# Patient Record
Sex: MALE | Race: WHITE | NOT HISPANIC OR LATINO | Employment: UNEMPLOYED | URBAN - METROPOLITAN AREA
[De-identification: names, ages, dates, MRNs, and addresses within clinical notes are randomized per-mention and may not be internally consistent; named-entity substitution may affect disease eponyms.]

---

## 2023-02-19 ENCOUNTER — HOSPITAL ENCOUNTER (EMERGENCY)
Facility: CLINIC | Age: 48
Discharge: HOME | End: 2023-02-19
Attending: EMERGENCY MEDICINE | Admitting: EMERGENCY MEDICINE
Payer: COMMERCIAL

## 2023-02-19 VITALS
TEMPERATURE: 98 F | OXYGEN SATURATION: 97 % | DIASTOLIC BLOOD PRESSURE: 97 MMHG | HEART RATE: 112 BPM | SYSTOLIC BLOOD PRESSURE: 142 MMHG

## 2023-02-19 DIAGNOSIS — F10.920 ALCOHOLIC INTOXICATION WITHOUT COMPLICATION (H): ICD-10-CM

## 2023-02-19 LAB
ALBUMIN SERPL BCG-MCNC: 4.6 G/DL (ref 3.5–5.2)
ALP SERPL-CCNC: 91 U/L (ref 40–129)
ALT SERPL W P-5'-P-CCNC: 64 U/L (ref 10–50)
ANION GAP SERPL CALCULATED.3IONS-SCNC: 15 MMOL/L (ref 7–15)
AST SERPL W P-5'-P-CCNC: 34 U/L (ref 10–50)
BASOPHILS # BLD AUTO: 0.1 10E3/UL (ref 0–0.2)
BASOPHILS NFR BLD AUTO: 1 %
BILIRUB SERPL-MCNC: 0.5 MG/DL
BUN SERPL-MCNC: 7.4 MG/DL (ref 6–20)
CALCIUM SERPL-MCNC: 9.6 MG/DL (ref 8.6–10)
CHLORIDE SERPL-SCNC: 99 MMOL/L (ref 98–107)
CREAT SERPL-MCNC: 1 MG/DL (ref 0.67–1.17)
DEPRECATED HCO3 PLAS-SCNC: 23 MMOL/L (ref 22–29)
EOSINOPHIL # BLD AUTO: 0.1 10E3/UL (ref 0–0.7)
EOSINOPHIL NFR BLD AUTO: 1 %
ERYTHROCYTE [DISTWIDTH] IN BLOOD BY AUTOMATED COUNT: 12.2 % (ref 10–15)
ETHANOL SERPL-MCNC: 0.15 G/DL
GFR SERPL CREATININE-BSD FRML MDRD: >90 ML/MIN/1.73M2
GLUCOSE SERPL-MCNC: 125 MG/DL (ref 70–99)
HCT VFR BLD AUTO: 49.7 % (ref 40–53)
HGB BLD-MCNC: 17.7 G/DL (ref 13.3–17.7)
HOLD SPECIMEN: NORMAL
IMM GRANULOCYTES # BLD: 0 10E3/UL
IMM GRANULOCYTES NFR BLD: 0 %
LYMPHOCYTES # BLD AUTO: 3 10E3/UL (ref 0.8–5.3)
LYMPHOCYTES NFR BLD AUTO: 38 %
MCH RBC QN AUTO: 31.7 PG (ref 26.5–33)
MCHC RBC AUTO-ENTMCNC: 35.6 G/DL (ref 31.5–36.5)
MCV RBC AUTO: 89 FL (ref 78–100)
MONOCYTES # BLD AUTO: 0.8 10E3/UL (ref 0–1.3)
MONOCYTES NFR BLD AUTO: 10 %
NEUTROPHILS # BLD AUTO: 3.9 10E3/UL (ref 1.6–8.3)
NEUTROPHILS NFR BLD AUTO: 50 %
NRBC # BLD AUTO: 0 10E3/UL
NRBC BLD AUTO-RTO: 0 /100
PLATELET # BLD AUTO: 301 10E3/UL (ref 150–450)
POTASSIUM SERPL-SCNC: 3.2 MMOL/L (ref 3.4–5.3)
PROT SERPL-MCNC: 7.6 G/DL (ref 6.4–8.3)
RBC # BLD AUTO: 5.59 10E6/UL (ref 4.4–5.9)
SARS-COV-2 RNA RESP QL NAA+PROBE: NEGATIVE
SODIUM SERPL-SCNC: 137 MMOL/L (ref 136–145)
WBC # BLD AUTO: 7.9 10E3/UL (ref 4–11)

## 2023-02-19 PROCEDURE — 85025 COMPLETE CBC W/AUTO DIFF WBC: CPT | Performed by: EMERGENCY MEDICINE

## 2023-02-19 PROCEDURE — 36415 COLL VENOUS BLD VENIPUNCTURE: CPT | Performed by: EMERGENCY MEDICINE

## 2023-02-19 PROCEDURE — 258N000003 HC RX IP 258 OP 636: Performed by: EMERGENCY MEDICINE

## 2023-02-19 PROCEDURE — 250N000013 HC RX MED GY IP 250 OP 250 PS 637: Performed by: EMERGENCY MEDICINE

## 2023-02-19 PROCEDURE — 99283 EMERGENCY DEPT VISIT LOW MDM: CPT | Mod: 25

## 2023-02-19 PROCEDURE — 96361 HYDRATE IV INFUSION ADD-ON: CPT

## 2023-02-19 PROCEDURE — U0003 INFECTIOUS AGENT DETECTION BY NUCLEIC ACID (DNA OR RNA); SEVERE ACUTE RESPIRATORY SYNDROME CORONAVIRUS 2 (SARS-COV-2) (CORONAVIRUS DISEASE [COVID-19]), AMPLIFIED PROBE TECHNIQUE, MAKING USE OF HIGH THROUGHPUT TECHNOLOGIES AS DESCRIBED BY CMS-2020-01-R: HCPCS | Performed by: EMERGENCY MEDICINE

## 2023-02-19 PROCEDURE — 96360 HYDRATION IV INFUSION INIT: CPT

## 2023-02-19 PROCEDURE — 82077 ASSAY SPEC XCP UR&BREATH IA: CPT | Performed by: EMERGENCY MEDICINE

## 2023-02-19 PROCEDURE — 80053 COMPREHEN METABOLIC PANEL: CPT | Performed by: EMERGENCY MEDICINE

## 2023-02-19 PROCEDURE — C9803 HOPD COVID-19 SPEC COLLECT: HCPCS

## 2023-02-19 RX ORDER — POTASSIUM CHLORIDE 1500 MG/1
40 TABLET, EXTENDED RELEASE ORAL ONCE
Status: DISCONTINUED | OUTPATIENT
Start: 2023-02-19 | End: 2023-02-19 | Stop reason: HOSPADM

## 2023-02-19 RX ORDER — FOLIC ACID 1 MG/1
2 TABLET ORAL ONCE
Status: COMPLETED | OUTPATIENT
Start: 2023-02-19 | End: 2023-02-19

## 2023-02-19 RX ORDER — MULTIVITAMIN,THERAPEUTIC
1 TABLET ORAL ONCE
Status: COMPLETED | OUTPATIENT
Start: 2023-02-19 | End: 2023-02-19

## 2023-02-19 RX ADMIN — THERA TABS 1 TABLET: TAB at 15:43

## 2023-02-19 RX ADMIN — SODIUM CHLORIDE 1000 ML: 9 INJECTION, SOLUTION INTRAVENOUS at 15:04

## 2023-02-19 RX ADMIN — THIAMINE HCL TAB 100 MG 100 MG: 100 TAB at 15:42

## 2023-02-19 RX ADMIN — FOLIC ACID 2 MG: 1 TABLET ORAL at 15:42

## 2023-02-19 ASSESSMENT — ACTIVITIES OF DAILY LIVING (ADL)
ADLS_ACUITY_SCORE: 35
ADLS_ACUITY_SCORE: 35

## 2023-02-19 ASSESSMENT — ENCOUNTER SYMPTOMS
VOMITING: 0
FEVER: 0
SHORTNESS OF BREATH: 0
HALLUCINATIONS: 0
CHILLS: 0

## 2023-02-19 NOTE — ED PROVIDER NOTES
History     Chief Complaint:  Alcohol Intoxication     The history is provided by the patient.      David Boyd is a 48 year old male with a history of hypertension, anxiety, depression, autism, and alcohol use disorder who presents via EMS for alcohol intoxication. The patient is currently living in a group home that specializes in mental health and chemical dependency but has continued to drink daily, with his last drink being just prior to EMS arrival. Presently, the patient states that he wants to stop drinking and is open to treatment for this. He denies any suicidal or homicidal ideations and notes having only 1 auditory hallucination in the past. He also denies any recent chest pain or shortness of breath and has had no vomiting, fever, or chills. The patient does have a history of withdrawal, without seizures, and currently uses chewing tobacco. He does not use cigarettes or other drugs.     Independent Historian:   None - Patient Only    Review of External Notes: See MDM below.    ROS:  Review of Systems   Constitutional: Negative for chills and fever.        (+) alcohol intoxication   Respiratory: Negative for shortness of breath.    Cardiovascular: Negative for chest pain.   Gastrointestinal: Negative for vomiting.   Psychiatric/Behavioral: Negative for hallucinations and suicidal ideas.        (-) homicidal ideations   All other systems reviewed and are negative.    Allergies:  The patient has no known drug allergies.    Medications:    Cymbalta  Ritalin  Seroquel  Abilify    Past Medical History:    ADHD  Anxiety  Depression  Alcohol use disorder, severe  Alcoholic hepatitis, unspecified whether ascites present  Autism spectrum disorder  Hypertension  Tobacco use  Multinodular goiter  Migraines   Hypokalemia  Hyponatremia     Past Surgical History:    PE tubes  Teeth extraction    Family History:    Neurologic disorder x3  Melanoma     Social History:  The patient presents to the ED alone.  The  patient arrived to the ED via EMS.  The patient currently lives in a group home.  PCP: Kory Yeager     Physical Exam     Patient Vitals for the past 24 hrs:   BP Temp Temp src Pulse Resp SpO2   02/19/23 1530 (!) 142/97 -- -- 112 (!) 0 97 %   02/19/23 1518 (!) 143/97 -- -- (!) 121 16 98 %   02/19/23 1515 (!) 143/97 -- -- 116 17 95 %   02/19/23 1502 (!) 131/98 98  F (36.7  C) Temporal -- -- --   02/19/23 1500 -- -- -- (!) 130 10 98 %      Physical Exam  Constitutional: Well developed, nontox appearance  Head: Atraumatic.   Mouth/Throat: Oropharynx is clear and moist.   Neck:  no stridor  Eyes: no scleral icterus  Cardiovascular: Regular tachycardia, 2+ bilat radial pulses  Pulmonary/Chest: nml resp effort  Abdominal: ND, soft, NT, no rebound or guarding   Ext: Warm, well perfused, no edema  Neurological: A&O, symmetric facies, moves ext x4  Skin: Skin is warm and dry.   Psychiatric: Behavior is normal. Thought content normal.   Nursing note and vitals reviewed.    Emergency Department Course     Laboratory:  Labs Ordered and Resulted from Time of ED Arrival to Time of ED Departure   COMPREHENSIVE METABOLIC PANEL - Abnormal       Result Value    Sodium 137      Potassium 3.2 (*)     Chloride 99      Carbon Dioxide (CO2) 23      Anion Gap 15      Urea Nitrogen 7.4      Creatinine 1.00      Calcium 9.6      Glucose 125 (*)     Alkaline Phosphatase 91      AST 34      ALT 64 (*)     Protein Total 7.6      Albumin 4.6      Bilirubin Total 0.5      GFR Estimate >90     ETHYL ALCOHOL LEVEL - Abnormal    Alcohol ethyl 0.15 (*)    COVID-19 VIRUS (CORONAVIRUS) BY PCR - Normal    SARS CoV2 PCR Negative     CBC WITH PLATELETS AND DIFFERENTIAL    WBC Count 7.9      RBC Count 5.59      Hemoglobin 17.7      Hematocrit 49.7      MCV 89      MCH 31.7      MCHC 35.6      RDW 12.2      Platelet Count 301      % Neutrophils 50      % Lymphocytes 38      % Monocytes 10      % Eosinophils 1      % Basophils 1      % Immature  Granulocytes 0      NRBCs per 100 WBC 0      Absolute Neutrophils 3.9      Absolute Lymphocytes 3.0      Absolute Monocytes 0.8      Absolute Eosinophils 0.1      Absolute Basophils 0.1      Absolute Immature Granulocytes 0.0      Absolute NRBCs 0.0        Emergency Department Course & Assessments:  Interventions:  Medications   potassium chloride ER (KLOR-CON M) CR tablet 40 mEq (has no administration in time range)   0.9% sodium chloride BOLUS (0 mLs Intravenous Stopped 23 1658)   thiamine (B-1) tablet 100 mg (100 mg Oral Given 23 154)   folic acid (FOLVITE) tablet 2 mg (2 mg Oral Given 23 1542)   multivitamin, therapeutic (THERA-VIT) tablet 1 tablet (1 tablet Oral Given 23 1543)      Independent Interpretation (X-rays, CTs, rhythm strip):  See MDM below.    Social Determinants of Health affecting care:   See MDM below.    Assessments:  1510: I performed an exam of the patient and obtained history, as documented above.   1639: I rechecked the patient and explained findings. I believe that he is safe for discharge at this time.    Disposition:  The patient was discharged to home.     Impression & Plan      Medical Decision Makin year old male presenting w/ alcohol intoxication     Social determinants affecting patient's health include:  History of alcohol dependence and alcohol withdrawal increasing risk for recurrent ED visits associated with alcohol     [place] [facility] [place]     Patient's presentation consistent with acute alcohol intoxication; the patient does not appear to be in acute withdrawal.  There is no evidence of trauma and they are clinically intoxicated on exam.  Labs demonstrated elevated alcohol level, minimal AST elevation, minimal hypokalemia.   Doubt alcoholic ketoacidosis, severe metabolic derangement.  Patient denies history of delirium tremens or withdrawal seizures.  Denies SI/HI. Plan to monitor the patient  until they achieve clinical sobriety or attains a sober ride.  Upon recheck, the patient appears clinically sober.  Given his reassuring vital signs and labs, At this time I feel the patient is safe for discharge.  AODA resources offered.  Recommendations given regarding follow up with PCP and return to the emergency department as needed for new or worsening symptoms.  Pt counseled on all results, diagnosis and disposition.  They are understanding and agreeable to plan. Patient discharged in stable condition.      Diagnosis:    ICD-10-CM    1. Alcoholic intoxication without complication (H)  F10.920          Scribe Disclosure:  ROBINSON Maryannedawna Cheung, am serving as a scribe at 5:19 PM on 2/19/2023 to document services personally performed by Christiano Quintero MD based on my observations and the provider's statements to me.      2/19/2023   Christiano Quintero MD Vaughn, Christopher E, MD  02/19/23 7109

## 2023-02-19 NOTE — DISCHARGE INSTRUCTIONS

## 2024-01-14 ENCOUNTER — HOSPITAL ENCOUNTER (EMERGENCY)
Facility: CLINIC | Age: 49
Discharge: HOME | End: 2024-01-14
Attending: EMERGENCY MEDICINE | Admitting: EMERGENCY MEDICINE
Payer: COMMERCIAL

## 2024-01-14 VITALS
SYSTOLIC BLOOD PRESSURE: 126 MMHG | DIASTOLIC BLOOD PRESSURE: 86 MMHG | HEART RATE: 100 BPM | RESPIRATION RATE: 16 BRPM | OXYGEN SATURATION: 93 % | WEIGHT: 230 LBS

## 2024-01-14 DIAGNOSIS — R10.9 ABDOMINAL CRAMPING: ICD-10-CM

## 2024-01-14 DIAGNOSIS — T50.901A MEDICATION OVERDOSE, ACCIDENTAL OR UNINTENTIONAL, INITIAL ENCOUNTER: ICD-10-CM

## 2024-01-14 DIAGNOSIS — F41.9 ANXIETY: ICD-10-CM

## 2024-01-14 LAB
ANION GAP SERPL CALCULATED.3IONS-SCNC: 15 MMOL/L (ref 7–15)
ATRIAL RATE - MUSE: 101 BPM
BASOPHILS # BLD AUTO: 0 10E3/UL (ref 0–0.2)
BASOPHILS NFR BLD AUTO: 1 %
BUN SERPL-MCNC: 13.6 MG/DL (ref 6–20)
CALCIUM SERPL-MCNC: 9.8 MG/DL (ref 8.6–10)
CHLORIDE SERPL-SCNC: 103 MMOL/L (ref 98–107)
CREAT SERPL-MCNC: 1.13 MG/DL (ref 0.67–1.17)
DEPRECATED HCO3 PLAS-SCNC: 20 MMOL/L (ref 22–29)
DIASTOLIC BLOOD PRESSURE - MUSE: NORMAL MMHG
EGFRCR SERPLBLD CKD-EPI 2021: 81 ML/MIN/1.73M2
EOSINOPHIL # BLD AUTO: 0.1 10E3/UL (ref 0–0.7)
EOSINOPHIL NFR BLD AUTO: 1 %
ERYTHROCYTE [DISTWIDTH] IN BLOOD BY AUTOMATED COUNT: 13.6 % (ref 10–15)
GLUCOSE SERPL-MCNC: 130 MG/DL (ref 70–99)
HCT VFR BLD AUTO: 44.9 % (ref 40–53)
HGB BLD-MCNC: 15.5 G/DL (ref 13.3–17.7)
HOLD SPECIMEN: NORMAL
IMM GRANULOCYTES # BLD: 0 10E3/UL
IMM GRANULOCYTES NFR BLD: 0 %
INTERPRETATION ECG - MUSE: NORMAL
LYMPHOCYTES # BLD AUTO: 1.4 10E3/UL (ref 0.8–5.3)
LYMPHOCYTES NFR BLD AUTO: 16 %
MCH RBC QN AUTO: 32.5 PG (ref 26.5–33)
MCHC RBC AUTO-ENTMCNC: 34.5 G/DL (ref 31.5–36.5)
MCV RBC AUTO: 94 FL (ref 78–100)
MONOCYTES # BLD AUTO: 0.8 10E3/UL (ref 0–1.3)
MONOCYTES NFR BLD AUTO: 9 %
NEUTROPHILS # BLD AUTO: 6.5 10E3/UL (ref 1.6–8.3)
NEUTROPHILS NFR BLD AUTO: 73 %
NRBC # BLD AUTO: 0 10E3/UL
NRBC BLD AUTO-RTO: 0 /100
P AXIS - MUSE: 37 DEGREES
PLATELET # BLD AUTO: 184 10E3/UL (ref 150–450)
POTASSIUM SERPL-SCNC: 3.5 MMOL/L (ref 3.4–5.3)
PR INTERVAL - MUSE: 142 MS
QRS DURATION - MUSE: 92 MS
QT - MUSE: 344 MS
QTC - MUSE: 446 MS
R AXIS - MUSE: 61 DEGREES
RBC # BLD AUTO: 4.77 10E6/UL (ref 4.4–5.9)
SODIUM SERPL-SCNC: 138 MMOL/L (ref 135–145)
SYSTOLIC BLOOD PRESSURE - MUSE: NORMAL MMHG
T AXIS - MUSE: 63 DEGREES
VENTRICULAR RATE- MUSE: 101 BPM
WBC # BLD AUTO: 8.9 10E3/UL (ref 4–11)

## 2024-01-14 PROCEDURE — 36415 COLL VENOUS BLD VENIPUNCTURE: CPT | Performed by: EMERGENCY MEDICINE

## 2024-01-14 PROCEDURE — 99284 EMERGENCY DEPT VISIT MOD MDM: CPT

## 2024-01-14 PROCEDURE — 85025 COMPLETE CBC W/AUTO DIFF WBC: CPT | Performed by: EMERGENCY MEDICINE

## 2024-01-14 PROCEDURE — 258N000003 HC RX IP 258 OP 636: Performed by: EMERGENCY MEDICINE

## 2024-01-14 PROCEDURE — 93005 ELECTROCARDIOGRAM TRACING: CPT

## 2024-01-14 PROCEDURE — 82374 ASSAY BLOOD CARBON DIOXIDE: CPT | Performed by: EMERGENCY MEDICINE

## 2024-01-14 PROCEDURE — 96360 HYDRATION IV INFUSION INIT: CPT

## 2024-01-14 RX ADMIN — SODIUM CHLORIDE 1000 ML: 9 INJECTION, SOLUTION INTRAVENOUS at 22:23

## 2024-01-14 ASSESSMENT — ACTIVITIES OF DAILY LIVING (ADL): ADLS_ACUITY_SCORE: 35

## 2024-01-15 NOTE — ED NOTES
Bed: ED18  Expected date: 1/14/24  Expected time: 9:40 PM  Means of arrival: Ambulance  Comments:  [facility] 417 48M overdose

## 2024-01-15 NOTE — DISCHARGE INSTRUCTIONS
Please limit your gabapentin to 100 mg 3 times a day for neuropathy.  This medication is not to be used on an hourly basis to treat anxiety.  There will be no life-threatening consequences to the excess medication that you took today but it is not advised as it can make you dizzy, lightheaded, and tired.  Please follow-up with your therapist as needed for anxiety management.    Discharge Instructions  Abdominal Pain    Abdominal pain (belly pain) can be caused by many things. Your evaluation today does not show the exact cause for your pain. Your provider today has decided that it is unlikely your pain is due to a life threatening problem, or a problem requiring surgery or hospital admission. Sometimes those problems cannot be found right away, so it is very important that you follow up as directed.  Sometimes only the changes which occur over time allow the cause of your pain to be found.    Generally, every Emergency Department visit should have a follow-up clinic visit with either a primary or a specialty clinic/provider. Please follow-up as instructed by your emergency provider today. With abdominal pain, we often recommend very close follow-up, such as the following day.    ADULTS:  Return to the Emergency Department right away if:    You get an oral temperature above 102oF or as directed by your provider.  You have blood in your stools. This may be bright red or appear as black, tarry stools.    You keep vomiting (throwing up) or cannot drink liquids.  You see blood when you vomit.   You cannot have a bowel movement or you cannot pass gas.  Your stomach gets bloated or bigger.  Your skin or the whites of your eyes look yellow.  You faint.  You have bloody, frequent or painful urination (peeing).  You have new symptoms or anything that worries you.    CHILDREN:  Return to the Emergency Department right away if your child has any of the above-listed symptoms or the following:    Pushes your hand away or  screams/cries when his/her belly is touched.  You notice your child is very fussy or weak.  Your child is very tired and is too tired to eat or drink.  Your child is dehydrated.  Signs of dehydration can be:  Significant change in the amount of wet diapers/urine.  Your infant or child starts to have dry mouth and lips, or no saliva (spit) or tears.    PREGNANT WOMEN:  Return to the Emergency Department right away if you have any of the above-listed symptoms or the following:    You have bleeding, leaking fluid or passing tissue from the vagina.  You have worse pain or cramping, or pain in your shoulder or back.  You have vomiting that will not stop.  You have a temperature of 100oF or more.  Your baby is not moving as much as usual.  You faint.  You get a bad headache with or without eye problems and abdominal pain.  You have a seizure.  You have unusual discharge from your vagina and abdominal pain.    Abdominal pain is pretty common during pregnancy.  Your pain may or may not be related to your pregnancy. You should follow-up closely with your OB provider so they can evaluate you and your baby.  Until you follow-up with your regular provider, do the following:     Avoid sex and do not put anything in your vagina.  Drink clear fluids.  Only take medications approved by your provider.    MORE INFORMATION:    Appendicitis:  A possible cause of abdominal pain in any person who still has their appendix is acute appendicitis. Appendicitis is often hard to diagnose.  Testing does not always rule out early appendicitis or other causes of abdominal pain. Close follow-up with your provider and re-evaluations may be needed to figure out the reason for your abdominal pain.    Follow-up:  It is very important that you make an appointment with your clinic and go to the appointment.  If you do not follow-up with your primary provider, it may result in missing an important development which could result in permanent injury or  "disability and/or lasting pain.  If there is any problem keeping your appointment, call your provider or return to the Emergency Department.    Medications:  Take your medications as directed by your provider today.  Before using over-the-counter medications, ask your provider and make sure to take the medications as directed.  If you have any questions about medications, ask your provider.    Diet:  Resume your normal diet as much as possible, but do not eat fried, fatty or spicy foods while you have pain.  Do not drink alcohol or have caffeine.  Do not smoke tobacco.    Probiotics: If you have been given an antibiotic, you may want to also take a probiotic pill or eat yogurt with live cultures. Probiotics have \"good bacteria\" to help your intestines stay healthy. Studies have shown that probiotics help prevent diarrhea (loose stools) and other intestine problems (including C. diff infection) when you take antibiotics. You can buy these without a prescription in the pharmacy section of the store.     If you were given a prescription for medicine here today, be sure to read all of the information (including the package insert) that comes with your prescription.  This will include important information about the medicine, its side effects, and any warnings that you need to know about.  The pharmacist who fills the prescription can provide more information and answer questions you may have about the medicine.  If you have questions or concerns that the pharmacist cannot address, please call or return to the Emergency Department.       Remember that you can always come back to the Emergency Department if you are not able to see your regular provider in the amount of time listed above, if you get any new symptoms, or if there is anything that worries you.    "

## 2024-01-15 NOTE — ED TRIAGE NOTES
"Patient arrives via EMS from home. Earlier today, he noted an odd stomach sensation as though he was \"on a rollercoaster.\" Patient states it felt like a \"nervous stomach\" and because he takes gabapentin for neuropathy, he assumed this medication might help his stomach feeling. He does not remember what time he took the first dose, but he started taking his prescribed gabapentin (100mg capsules), one at a time, hourly, ten times (total of 1000 mg over about 8 hours). Patient states he had absolutely no desire to cause harm to himself, denies any suicidal ideation.  Denies use of any alcohol or recreational or illicit drugs today. Does not that he took his prescribed medications (as prescribed) today which include lisinopril, cymbalta, abilify, topamax, multivitamin, amlodipine, and possibly another that he cannot remember.  En route with EMS, soft SBPs (90s), so he received 250mL NS and BP up to 115/73, BG WNL.        "

## 2024-01-15 NOTE — ED PROVIDER NOTES
History     Chief Complaint:  Drug Overdose       HPI   David Boyd is a 48 year old male who presents to the ED with an episode of upset stomach earlier today which he described as feeling like his stomach was on a roller coaster.  He believes he had some nausea followed by an episode of emesis.  The patient became quite anxious.  He does have a history of alcohol abuse, depression, and anxiety.  He also has a history of ADHD.  The patient takes Seroquel Ritalin and Cymbalta at baseline.  He notes that he has also been prescribed gabapentin 100 mg 3 times a day for neuropathy.  The patient started taking 100 mg of gabapentin every hour on the hour for approximately 8 to 10 hours, he believes that he took approximately 800 to 1000 mg over the course of the day.  He began feeling slightly sleepy and tired.  He notes that his anxiety and improved and his nausea improved.  He has no abdominal pain or nausea at this time and does not have any diarrhea.  Patient notes that he struggles with alcohol in the past but but has not been drinking any time recently.  He does have a therapist and an upcoming appointment.  He vehemently denies suicidal ideation, homicidal ideation, visual or auditory hallucinations, and notes that he is actually feeling quite good right now and does not wish to speak with a therapist as he does not feel like there is a need.  The patient does not have a history of recent fever, cough, shortness of breath, chest pain, and he does not use CPAP at baseline.  He has not been coughing.  He has no history of DVT.      Independent Historian:     None  Review of External Notes:  None    Allergies:  No Known Allergies     Listed Medications:    Aspirin-Acetaminophen-Caffeine (EXCEDRIN PO)  DULoxetine HCl (CYMBALTA PO)  methylphenidate (RITALIN) 10 MG tablet  Multiple Vitamins-Minerals (MULTIVITAL PO)  Nutritional Supplements (MELATONIN PO)  quetiapine (SEROQUEL) 25 MG tablet        Past Medical and  Surgical History:    Past Medical History:   Diagnosis Date    ADHD (attention deficit hyperactivity disorder)     Anxiety     Depression     ETOHism (H)     Ingrowing nail      Past Surgical History:   Procedure Laterality Date    PE TUBES      ZZC ORAL SURGERY PROCEDURE      TEETH EXT        Family History:    family history includes Neurologic Disorder in his brother, mother, and sister.    Social History:   reports that he quit smoking about 18 years ago. He does not have any smokeless tobacco history on file. He reports that he does not drink alcohol and does not use drugs.      Physical Exam   Patient Vitals for the past 24 hrs:   Weight   01/14/24 2204 104.3 kg (230 lb)        General: Patient is resting comfortably on the gurney.  He has a low-grade tachycardia at 103 bpm.  He is not short of breath.  His oxygen saturations ranged from 93 to 99% on room air depending on his tidal volumes.  Head:  The scalp, face, and head appear normal  Eyes:  The pupils are equal, round, and reactive to light    Extraocular muscles are intact    Conjunctivae and sclerae are normal  ENT:    The nose is normal    Pinnae are normal    There is no horizontal or vertical nystagmus.    The oropharynx is normal  Neck:  Normal range of motion  CV:  Regular rate.  At times he has a low-grade sinus tachycardia at 103 to 106 bpm.  Normal underlying rhythm     No pathological murmur detected  Resp:  Lungs are clear    There is no tachypnea    Non-labored    No rales    No wheezing   GI:  Abdomen is soft, there is no rigidity    No distension/tympani    No rebound tenderness     Non-surgical without peritoneal features at this time    The abdomen is completely benign to palpation.  MS:  Normal muscular tone    Symmetric motor strength    No major joint effusions  Skin:  No rash or acute skin lesions noted  Neuro:  Normal and fluent speech    No motor deficits  Psych: Awake    Alert    Normal affect    No suicidal or homicidal ideation.   No acute hallucinations.  No delusions.          Emergency Department Course   EKG:  ECG results from 01/14/24   EKG 12-lead, tracing only     Value    Systolic Blood Pressure     Diastolic Blood Pressure     Ventricular Rate 101    Atrial Rate 101    FL Interval 142    QRS Duration 92        QTc 446    P Axis 37    R AXIS 61    T Axis 63    Interpretation ECG      Sinus tachycardia  Otherwise normal ECG  No previous ECGs available  Confirmed by GENERATED REPORT, COMPUTER ([id number]),  Aasen, Bradley ([id number]) on 1/14/2024 10:17:02 PM          Laboratory:  Labs Ordered and Resulted from Time of ED Arrival to Time of ED Departure   CBC WITH PLATELETS AND DIFFERENTIAL       Result Value    WBC Count 8.9      RBC Count 4.77      Hemoglobin 15.5      Hematocrit 44.9      MCV 94      MCH 32.5      MCHC 34.5      RDW 13.6      Platelet Count 184      % Neutrophils 73      % Lymphocytes 16      % Monocytes 9      % Eosinophils 1      % Basophils 1      % Immature Granulocytes 0      NRBCs per 100 WBC 0      Absolute Neutrophils 6.5      Absolute Lymphocytes 1.4      Absolute Monocytes 0.8      Absolute Eosinophils 0.1      Absolute Basophils 0.0      Absolute Immature Granulocytes 0.0      Absolute NRBCs 0.0     URINE DRUG SCREEN PANEL   BASIC METABOLIC PANEL        Procedures:  Procedures       Emergency Department Course & Assessments:             Interventions/ED Medications:  Medications   sodium chloride 0.9% BOLUS 1,000 mL (1,000 mLs Intravenous $New Bag 1/14/24 9943)          Independent Interpretation of Radiology Studies by Dr. Raza      Assessments/Consultations/Discussion of Management:     Patient was seen in the emergency department on arrival to the room.  IV fluid is administered.  Screening laboratories were obtained by the nurse.    Patient was reassessed at 2250 hrs.    Disposition:  Home    Impression & Plan        Medical Decision Making:  This patient presents to the emergency department after an  episode of abdominal cramps earlier today.  Patient took several extra doses of gabapentin totaling approximately 800 to 1000 mg taken systematically on an hourly basis over the course of the day.  This did help his anxiety and did resolve the abdominal cramps and nausea that he had.  There was no attempt to overdose in a suicidal fashion on this medication.  The patient felt that if it was good for the neuropathy it might help for his anxiety and the abdominal on rest.  There is no significant EKG abnormalities such as prolonged QT syndrome or QRS widening.  Patient's heart rate is approximately 100 bpm.  He was hydrated in the emergency department.  He is not suicidal or homicidal and not feeling anxious at this time.  Patient was offered consultation with the mental health therapist but he does not feel particularly anxious and has a therapist of his own and is completely comfortable following up as an outpatient.  There is no life-threatening etiologies detected from the increase of the gabapentin dosing, of course gabapentin has a wide therapeutic index and many patients take 600 to 800 mg 3 times a day routinely and the patient took 800 to 1000 mg over the course of about 10 hours.  The patient is a good candidate for discharge.  He was advised not to use the gabapentin in this fashion in the future.        Diagnosis:    ICD-10-CM    1. Medication overdose, accidental or unintentional, initial encounter  T50.901A       2. Anxiety  F41.9       3. Abdominal cramping  R10.9                Raghu Raza MD  1/14/2024   Raghu Raza MD Rock, Michael P, MD  01/14/24 0229

## 2025-06-17 NOTE — ED NOTES
Bed: ED16  Expected date:   Expected time:   Means of arrival:   Comments:  [facility] here now   Abdominal Pain, N/V/D